# Patient Record
Sex: FEMALE | Race: BLACK OR AFRICAN AMERICAN | URBAN - METROPOLITAN AREA
[De-identification: names, ages, dates, MRNs, and addresses within clinical notes are randomized per-mention and may not be internally consistent; named-entity substitution may affect disease eponyms.]

---

## 2018-01-31 ENCOUNTER — ON CAMPUS - OUTPATIENT (OUTPATIENT)
Dept: URBAN - METROPOLITAN AREA HOSPITAL 131 | Facility: HOSPITAL | Age: 73
End: 2018-01-31

## 2018-01-31 DIAGNOSIS — K29.00 ACUTE GASTRITIS WITHOUT BLEEDING: ICD-10-CM

## 2018-01-31 DIAGNOSIS — K29.80 DUODENITIS WITHOUT BLEEDING: ICD-10-CM

## 2018-01-31 DIAGNOSIS — D37.8 NEOPLASM OF UNCERTAIN BEHAVIOR OF OTHER SPECIFIED DIGESTIVE: ICD-10-CM

## 2018-01-31 DIAGNOSIS — K26.3 ACUTE DUODENAL ULCER WITHOUT HEMORRHAGE OR PERFORATION: ICD-10-CM

## 2018-01-31 PROCEDURE — 43238 EGD US FINE NEEDLE BX/ASPIR: CPT | Performed by: INTERNAL MEDICINE

## 2018-06-27 ENCOUNTER — CLINICAL SUPPORT (OUTPATIENT)
Dept: OPHTHALMOLOGY | Facility: CLINIC | Age: 73
End: 2018-06-27
Payer: MEDICARE

## 2018-06-27 ENCOUNTER — OFFICE VISIT (OUTPATIENT)
Dept: OPHTHALMOLOGY | Facility: CLINIC | Age: 73
End: 2018-06-27
Payer: MEDICARE

## 2018-06-27 VITALS — DIASTOLIC BLOOD PRESSURE: 69 MMHG | SYSTOLIC BLOOD PRESSURE: 179 MMHG | HEART RATE: 72 BPM

## 2018-06-27 DIAGNOSIS — H02.533 EYELID RETRACTION OF BOTH EYES: ICD-10-CM

## 2018-06-27 DIAGNOSIS — H16.213 EXPOSURE KERATOPATHY, BILATERAL: ICD-10-CM

## 2018-06-27 DIAGNOSIS — H02.203 LAGOPHTHALMOS OF EYELIDS OF BOTH EYES: ICD-10-CM

## 2018-06-27 DIAGNOSIS — E05.00 GRAVES DISEASE: Primary | ICD-10-CM

## 2018-06-27 DIAGNOSIS — H02.206 LAGOPHTHALMOS OF EYELIDS OF BOTH EYES: ICD-10-CM

## 2018-06-27 DIAGNOSIS — H02.536 EYELID RETRACTION OF BOTH EYES: ICD-10-CM

## 2018-06-27 PROCEDURE — 92004 COMPRE OPH EXAM NEW PT 1/>: CPT | Mod: 25,S$PBB,, | Performed by: OPHTHALMOLOGY

## 2018-06-27 PROCEDURE — 99999 PR PBB SHADOW E&M-NEW PATIENT-LVL II: CPT | Mod: PBBFAC,,, | Performed by: OPHTHALMOLOGY

## 2018-06-27 PROCEDURE — 92083 EXTENDED VISUAL FIELD XM: CPT | Mod: 26,S$PBB,, | Performed by: OPHTHALMOLOGY

## 2018-06-27 PROCEDURE — 67875 CLOSURE OF EYELID BY SUTURE: CPT | Mod: S$PBB,LT,, | Performed by: OPHTHALMOLOGY

## 2018-06-27 PROCEDURE — 92083 EXTENDED VISUAL FIELD XM: CPT | Mod: PBBFAC

## 2018-06-27 PROCEDURE — 67875 CLOSURE OF EYELID BY SUTURE: CPT | Mod: PBBFAC | Performed by: OPHTHALMOLOGY

## 2018-06-27 PROCEDURE — 99202 OFFICE O/P NEW SF 15 MIN: CPT | Mod: PBBFAC,25 | Performed by: OPHTHALMOLOGY

## 2018-06-27 RX ORDER — POTASSIUM CHLORIDE 750 MG/1
10 CAPSULE, EXTENDED RELEASE ORAL ONCE
COMMUNITY

## 2018-06-27 RX ORDER — CLOPIDOGREL BISULFATE 75 MG/1
75 TABLET ORAL DAILY
COMMUNITY

## 2018-06-27 RX ORDER — MOXIFLOXACIN 5 MG/ML
1 SOLUTION/ DROPS OPHTHALMIC 3 TIMES DAILY
COMMUNITY

## 2018-06-27 RX ORDER — BACITRACIN 500 [USP'U]/G
OINTMENT OPHTHALMIC EVERY 4 HOURS
COMMUNITY

## 2018-06-27 RX ORDER — CARVEDILOL 25 MG/1
25 TABLET ORAL 2 TIMES DAILY WITH MEALS
COMMUNITY

## 2018-06-27 RX ORDER — PREDNISONE 20 MG/1
TABLET ORAL DAILY
COMMUNITY

## 2018-06-27 RX ORDER — LANOLIN ALCOHOL/MO/W.PET/CERES
400 CREAM (GRAM) TOPICAL DAILY
COMMUNITY

## 2018-06-27 RX ORDER — LORAZEPAM 1 MG/1
1 TABLET ORAL EVERY 6 HOURS PRN
COMMUNITY

## 2018-06-27 RX ORDER — METFORMIN HYDROCHLORIDE 500 MG/1
500 TABLET ORAL 2 TIMES DAILY WITH MEALS
COMMUNITY

## 2018-06-27 RX ORDER — SPIRONOLACTONE 50 MG/1
50 TABLET, FILM COATED ORAL DAILY
COMMUNITY

## 2018-06-27 RX ORDER — PANTOPRAZOLE SODIUM 40 MG/1
40 TABLET, DELAYED RELEASE ORAL DAILY
COMMUNITY

## 2018-06-27 RX ORDER — FUROSEMIDE 80 MG/1
80 TABLET ORAL 2 TIMES DAILY
COMMUNITY

## 2018-06-27 RX ORDER — HYDRALAZINE HYDROCHLORIDE 25 MG/1
25 TABLET, FILM COATED ORAL 3 TIMES DAILY
COMMUNITY

## 2018-06-27 RX ORDER — PREDNISOLONE ACETATE 10 MG/ML
1 SUSPENSION/ DROPS OPHTHALMIC 4 TIMES DAILY
COMMUNITY
End: 2018-07-30

## 2018-06-27 RX ORDER — LOSARTAN POTASSIUM AND HYDROCHLOROTHIAZIDE 12.5; 1 MG/1; MG/1
1 TABLET ORAL DAILY
COMMUNITY

## 2018-06-27 RX ORDER — AMLODIPINE BESYLATE 10 MG/1
10 TABLET ORAL DAILY
COMMUNITY

## 2018-06-27 RX ORDER — WARFARIN 3 MG/1
3 TABLET ORAL DAILY
COMMUNITY

## 2018-06-27 RX ORDER — METOLAZONE 5 MG/1
5 TABLET ORAL DAILY
COMMUNITY

## 2018-06-27 NOTE — PROGRESS NOTES
HPI     Ref. By Dr Reji Gardner graves disease  MRI done yesterday  Pt. State os will not close. Came in today with lid taped    Last edited by Ami Everett on 6/27/2018  4:28 PM. (History)            Assessment /Plan     For exam results, see Encounter Report.    Graves disease    Lagophthalmos of eyelids of both eyes    Eyelid retraction of both eyes    Exposure keratopathy, bilateral      MRI Orbits reviewed: Bilateral enlargement of muscle bellies consistent with grave's disease.     73 y female with active Grave's disease with corneal compromise os with epithelial defect. Patient already on oral prednisone 60 mg po q day. Plan for tarsorrhaphy os today with window to allow antibiotic drops.     Procedure note:   The patient was prepped and draped in the usual manner for ophthalmic plastic surgery. A double-armed 4-0 silk suture was passed through a styrofoam bolster, the skin, and the gray line of the lower eyelid and repeated in reverse for the upper eyelid and tied. This closed the eye. Tobradex ointment was applied to the bolsters and the eye. There were no complications. The patient tolerated the procedure well.     Recommend genteal ointment qhs od. Continue bacitracin ointment qhs os. Start vigamox qid os.     Follow-up with Dr. Gardner as scheduled on Friday.

## 2018-06-28 NOTE — PROGRESS NOTES
HVF done OU    Fix/rel- fair OU  Coop- fair    Pt had a lot of eye and head movement  Dr. Dye pt    AM/MIKET

## 2018-07-03 ENCOUNTER — OFFICE VISIT (OUTPATIENT)
Dept: OPHTHALMOLOGY | Facility: CLINIC | Age: 73
End: 2018-07-03
Payer: MEDICARE

## 2018-07-03 DIAGNOSIS — H16.213 EXPOSURE KERATOPATHY, BILATERAL: ICD-10-CM

## 2018-07-03 DIAGNOSIS — Z98.890 POST-OPERATIVE STATE: Primary | ICD-10-CM

## 2018-07-03 DIAGNOSIS — E05.00 GRAVES DISEASE: ICD-10-CM

## 2018-07-03 DIAGNOSIS — H02.533 EYELID RETRACTION OF BOTH EYES: ICD-10-CM

## 2018-07-03 DIAGNOSIS — H02.206 LAGOPHTHALMOS OF EYELIDS OF BOTH EYES: ICD-10-CM

## 2018-07-03 DIAGNOSIS — H02.203 LAGOPHTHALMOS OF EYELIDS OF BOTH EYES: ICD-10-CM

## 2018-07-03 DIAGNOSIS — H02.536 EYELID RETRACTION OF BOTH EYES: ICD-10-CM

## 2018-07-03 PROCEDURE — 99212 OFFICE O/P EST SF 10 MIN: CPT | Mod: PBBFAC | Performed by: OPHTHALMOLOGY

## 2018-07-03 PROCEDURE — 99024 POSTOP FOLLOW-UP VISIT: CPT | Mod: POP,,, | Performed by: OPHTHALMOLOGY

## 2018-07-03 PROCEDURE — 99999 PR PBB SHADOW E&M-EST. PATIENT-LVL II: CPT | Mod: PBBFAC,,, | Performed by: OPHTHALMOLOGY

## 2018-07-03 NOTE — PROGRESS NOTES
HPI     72 YO females presents today for a post-op visit, tarsorrhaphy OS on   6/27/2018. She states that she is doing well, no problems or complaints.   She denies any eye pain or irritation. On PF tid os, and erythromycin everette   qid os.     Last edited by Natasha Dye MD on 7/3/2018  9:30 AM. (History)            Assessment /Plan     For exam results, see Encounter Report.    Post-operative state    Graves disease    Lagophthalmos of eyelids of both eyes    Eyelid retraction of both eyes    Exposure keratopathy, bilateral      Patient doing much better after tarsorrhaphy. Oral prednisone tapered to 40 mg po q daily. Patient currently on PF Tid and erythromycin everette qid.    Return in one week to remove tarsorrhaphy. Patient comfortable.      Follow-up with Dr. Gardner as previously scheduled.

## 2018-07-10 ENCOUNTER — OFFICE VISIT (OUTPATIENT)
Dept: OPHTHALMOLOGY | Facility: CLINIC | Age: 73
End: 2018-07-10
Payer: MEDICARE

## 2018-07-10 DIAGNOSIS — H02.533 EYELID RETRACTION OF BOTH EYES: Primary | ICD-10-CM

## 2018-07-10 DIAGNOSIS — E05.00 GRAVES DISEASE: ICD-10-CM

## 2018-07-10 DIAGNOSIS — H02.536 EYELID RETRACTION OF BOTH EYES: Primary | ICD-10-CM

## 2018-07-10 DIAGNOSIS — H16.213 EXPOSURE KERATOPATHY, BILATERAL: ICD-10-CM

## 2018-07-10 PROCEDURE — 99212 OFFICE O/P EST SF 10 MIN: CPT | Mod: PBBFAC | Performed by: OPHTHALMOLOGY

## 2018-07-10 PROCEDURE — 99999 PR PBB SHADOW E&M-EST. PATIENT-LVL II: CPT | Mod: PBBFAC,,, | Performed by: OPHTHALMOLOGY

## 2018-07-10 PROCEDURE — 99024 POSTOP FOLLOW-UP VISIT: CPT | Mod: POP,,, | Performed by: OPHTHALMOLOGY

## 2018-07-10 NOTE — PROGRESS NOTES
HPI     74 YO female presents today for a post-op visit. She states she is doing   well, C/O tearing and FB sensation OD. OS is fine.     Last edited by MEMO Johnson on 7/10/2018  3:32 PM. (History)            Assessment /Plan     For exam results, see Encounter Report.    Eyelid retraction of both eyes    Graves disease    Exposure keratopathy, bilateral    Other orders  -     Cancel: External/Slit Lamp Photography  -     Cancel: Goldmann Perimetry - OU - Intermediate - Both Eyes      Patient doing much better after tarsorrhaphy. Oral prednisone tapered to 40 mg po q daily. Patient currently on PF Tid and erythromycin everette qid.    Currently being followed by Dr. Wade Gonzales to control thyroid levels and diabetes.     Tarsorrhaphy removed today    Patient comfortable OS and now starting to have sx OD - rec aggressive lubrication OU with taping at night    Follow-up with Dr. Gardner as previously scheduled.    I have reviewed and concur with the resident's history, physical, assessment, and plan.  I have personally interviewed and examined the patient.

## 2018-07-30 ENCOUNTER — OFFICE VISIT (OUTPATIENT)
Dept: OPHTHALMOLOGY | Facility: CLINIC | Age: 73
End: 2018-07-30
Payer: MEDICARE

## 2018-07-30 DIAGNOSIS — H16.213 EXPOSURE KERATOPATHY, BILATERAL: ICD-10-CM

## 2018-07-30 DIAGNOSIS — E05.00 GRAVES DISEASE: ICD-10-CM

## 2018-07-30 DIAGNOSIS — H02.533 EYELID RETRACTION OF BOTH EYES: Primary | ICD-10-CM

## 2018-07-30 DIAGNOSIS — H02.536 EYELID RETRACTION OF BOTH EYES: Primary | ICD-10-CM

## 2018-07-30 PROCEDURE — 92012 INTRM OPH EXAM EST PATIENT: CPT | Mod: S$PBB,,, | Performed by: OPHTHALMOLOGY

## 2018-07-30 PROCEDURE — 99999 PR PBB SHADOW E&M-EST. PATIENT-LVL II: CPT | Mod: PBBFAC,,, | Performed by: OPHTHALMOLOGY

## 2018-07-30 PROCEDURE — 99212 OFFICE O/P EST SF 10 MIN: CPT | Mod: PBBFAC | Performed by: OPHTHALMOLOGY

## 2018-07-30 RX ORDER — CLONIDINE HYDROCHLORIDE 0.3 MG/1
TABLET ORAL
COMMUNITY
Start: 2018-07-03

## 2018-07-30 RX ORDER — INSULIN LISPRO 100 [IU]/ML
INJECTION, SOLUTION INTRAVENOUS; SUBCUTANEOUS
COMMUNITY
Start: 2018-07-23

## 2018-07-30 RX ORDER — INSULIN DETEMIR 100 [IU]/ML
INJECTION, SOLUTION SUBCUTANEOUS
COMMUNITY
Start: 2018-07-19

## 2018-07-30 NOTE — PROGRESS NOTES
HPI     Here for 2 week f/u tarsorrhaphy removed 7/10/18  Was seen by Dr Gardner 7/25/18  Using systane gel qid ou            Erythromycin everette qid os            15 mg prednisone a day    Last edited by Ami Everett on 7/30/2018  8:48 AM. (History)            Assessment /Plan     For exam results, see Encounter Report.    Eyelid retraction of both eyes    Exposure keratopathy, bilateral        Patient doing well after tarsorrhaphy, removed 7/10/18  Tapering off PO pred  Lag OU but no corneal exposure  No new epi defects OU - pooling in inferior cornea OS stable  Currently being followed by Dr. Wade Gonzales to control thyroid levels and diabetes.   Rec continued aggressive lubrication OU  Rec start taping at night (counseled to start at last visit but did not)    Transferring majority of care to [place], [place]  Dr. Michael Chang (ophtho)  Office: [phone]    RTC 1 month prn sooner any worsening    I have reviewed and concur with the resident's history, physical, assessment, and plan.  I have personally interviewed and examined the patient.